# Patient Record
(demographics unavailable — no encounter records)

---

## 2025-04-16 NOTE — HISTORY OF PRESENT ILLNESS
[FreeTextEntry1] : 56 year old male with PMH of HTN, HLD and DM presented to [facility] with chest pain. Pt had cardiac CT 2 weeks ago showing LAD 90%, was planned for LHC on Thursday at [facility] but developed chest pain last night prompting him to present to ED at [facility]. He described his chest pain as tingling and stabbing on left lateral wall of his chest, non radiating. In [facility] ED, EKG with no NICOLE but had positive troponin. s/p LHC showing LM 90%. LAD 50-60%, LCx 40%, RCA mild collateral to OM, RPL 70%, ramux 70%, IABP was placed. He was transferred to [facility] for CABG evaluation  4/10 s/p C4B left radial harvest site; Post op hypotension related to hypovolemia, Post op bradycardia requiring Atrial pacing 4/11 VSS; A- paced 90s on Lop 25 BID, Amio ERP. on 4 L NC --> wean to Room Air spo2 goal of 92% and above. Weight 86 kg from 76 preop --> received lasix 20 x 1 this AM, lasix 40 bid and aldactone 25 qd to start tomorrow. increase ambulation. increase use of incentive spirometer. 4/12 Right chest tube mediastinals and PW removed. Trinh removed 4/13 Right chest tube removed, voiding, +BM. Plan for discharge in am. Trend creat 4/14 Creat 1.6. Baseline 1.5. Decrease diuretics. VSS Discharge home Disposition: Home no PT needs. 4/16 Initial visit completed at home  CC " I feel well"  Detail Level: Detailed

## 2025-04-16 NOTE — ASSESSMENT
[FreeTextEntry1] : Pt recovering well at home s/p OHS. Reviewed all medications and dosages with pt understanding. Pt has all medications in home and is taking as prescribed. Pt. potassium level on day of discharge was 3.7 and was sent home on KCL 20MEQ QD, Spironolactone 25mg QD and lasix 40mg QD. CTS ACP Gigi Patel made aware and pt. to have repeat CMP during POA on 4/22. Pain controlled with current medication regimen. No new symptoms, issues or concerns to report at this time. pt. encouraged to keep good glycemic control and follow consistent carbohydrate diet.

## 2025-04-16 NOTE — PLAN
[TextEntry] : Post Operative Care:  Pt advised of importance of daily weights. Pt instructed on how to use incentive spirometer every hour, demonstrated proper use. Pt encouraged to ambulate as much as tolerated, avoiding extreme temperatures outdoors. Also advised to cleanse incisions daily with mild soap and water and to avoid lotions, powders, ointments or creams near or on the incision. Low salt, low fat diet encouraged and discussed. Pt advised to avoid heavy lifting or straining.     [facility] team will continue to follow up with pt status.  NP/CCC roles explained with pt understanding, contact information provided. Pt agrees to call with any questions, issues or concerns.  Worsening symptoms reviewed with patient understanding.      FOLLOW UP APPOINTMENTS:  CTS:  4/22  CARDIOLOGIST:  pt. will call to schedule appointment to be seen within 2 weeks   PCP:4/23

## 2025-04-16 NOTE — ASSESSMENT
[FreeTextEntry1] : 56 year old male with PMH of HTN, HLD and DM presented to [facility] with chest pain. Pt had cardiac CT 2 weeks ago showing LAD 90%, was planned for LHC on Thursday at [facility] but developed chest pain last night prompting him to present to ED at [facility]. At [facility] ED, EKG with no NICOLE but had positive troponin.  s/p LHC  showing LM 90%. LAD 50-60%, LCx 40%, RCA mild collateral to OM, RPL 70%, ramux 70%, IABP was placed.  He was transferred to [facility] for CABG evaluation  4/10 s/p C4B left radial harvest site  Post op hypotension related to hypovolemia, Post op bradycardia requiring Atrial pacing, A- paced 90s on Lop 25 BID, Amio ERP. on 4 L NC --> wean to Room Air, Weight up to 86 kg from 76 preop --> received lasix 20 x 1 this AM, lasix 40 bid and aldactone 25 qd to start tomorrow.  Increase ambulation. Trinh removed Trend Cr, Baseline 1.5. Decrease diuretics. VSS Discharge home  *** Needs CMP drawn today given Lasix, aldactone and 20meq of K  aspirin 81 mg oral delayed release tablet: 1 tab(s) orally once a day atorvastatin 80 mg oral tablet: 1 tab(s) orally once a day (at bedtime) cholecalciferol 1250 mcg (50,000 intl units) oral capsule: 1 cap(s) orally once a week furosemide 40 mg oral tablet: 1 tab(s) orally once a day glimepiride 1 mg oral tablet: 1 tab(s) orally once a day HYDROmorphone 2 mg oral tablet: 1 tab(s) orally every 6 hours as needed for Moderate Pain (4 - 6) MDD: metoprolol succinate 50 mg oral capsule, extended release: 1 cap(s) orally once a day pantoprazole 40 mg oral delayed release tablet: 1 tab(s) orally once a day potassium chloride 20 mEq oral tablet, extended release: 1 tab(s) orally once senna leaf extract oral tablet: 2 tab(s) orally once a day (at bedtime) sitagliptin (as base)-metformin 50 mg-1000 mg oral tablet, extended release: 1 tab(s) orally 2 times a day spironolactone 25 mg oral tablet: 1 tab(s) orally once a day tamsulosin 0.4 mg oral capsule: 1 cap(s) orally once a day (at bedtime)

## 2025-04-16 NOTE — PHYSICAL EXAM
[] : no respiratory distress [Exaggerated Use Of Accessory Muscles For Inspiration] : no accessory muscle use [Auscultation Breath Sounds / Voice Sounds] : lungs were clear to auscultation bilaterally [Heart Sounds] : normal S1 and S2 [Chest Visual Inspection Thoracic Asymmetry] : no chest asymmetry [Bowel Sounds] : normal bowel sounds [Oriented To Time, Place, And Person] : oriented to person, place, and time [FreeTextEntry2] : no LE edema noted [FreeTextEntry1] : Left radial and LLE SVG site kevin and well approximated. No erythema or drainage  noted

## 2025-04-22 NOTE — REASON FOR VISIT
[Family Member] : family member [de-identified] : C4B left radial harvest site [de-identified] : 4/10/25

## 2025-04-22 NOTE — REASON FOR VISIT
[Family Member] : family member [de-identified] : C4B left radial harvest site [de-identified] : 4/10/25

## 2025-04-22 NOTE — ASSESSMENT
[FreeTextEntry1] : 56 year old male with PMH of HTN, HLD and DM presented to [facility] with chest pain. Pt had cardiac CT 2 weeks ago showing LAD 90%, was planned for LHC on Thursday at [facility] but developed chest pain last night prompting him to present to ED at [facility]. At [facility] ED, EKG with no NICOLE but had positive troponin.  s/p LHC  showing LM 90%. LAD 50-60%, LCx 40%, RCA mild collateral to OM, RPL 70%, ramux 70%, IABP was placed.  He was transferred to [facility] for CABG evaluation  4/10 s/p C4B left radial harvest site  Post op hypotension related to hypovolemia, Post op bradycardia requiring Atrial pacing, A- paced 90s on Lop 25 BID, Amio ERP. on 4 L NC --> wean to Room Air, Weight up to 86 kg from 76 preop --> received lasix 20 x 1 this AM, lasix 40 bid and aldactone 25 qd to start tomorrow.  Increase ambulation. Trinh removed Trend Cr, Baseline 1.5. Decrease diuretics. VSS Discharge home  Discharge meds   aspirin 81 mg oral delayed release tablet: 1 tab(s) orally once a day atorvastatin 80 mg oral tablet: 1 tab(s) orally once a day (at bedtime) cholecalciferol 1250 mcg (50,000 intl units) oral capsule: 1 cap(s) orally once a week furosemide 40 mg oral tablet: 1 tab(s) orally once a day glimepiride 1 mg oral tablet: 1 tab(s) orally once a day HYDROmorphone 2 mg oral tablet: 1 tab(s) orally every 6 hours as needed for Moderate Pain (4 - 6) MDD: metoprolol succinate 50 mg oral capsule, extended release: 1 cap(s) orally once a day pantoprazole 40 mg oral delayed release tablet: 1 tab(s) orally once a day potassium chloride 20 mEq oral tablet, extended release: 1 tab(s) orally once senna leaf extract oral tablet: 2 tab(s) orally once a day (at bedtime) sitagliptin (as base)-metformin 50 mg-1000 mg oral tablet, extended release: 1 tab(s) orally 2 times a day spironolactone 25 mg oral tablet: 1 tab(s) orally once a day tamsulosin 0.4 mg oral capsule: 1 cap(s) orally once a day (at bedtime)  Today he presents and reports that he has shortness of breath which is getting better, tired and occasional palpitations.    Today on exam bilateral lung fields are clear, no wheezing or rales, no use of accessory muscles noted or respiratory distress, normal sinus rhythm, sternum stable, incision clean, dry and intact. LT radial and LT  SVG site is clean, dry and intact.  No peripheral edema noted. Sutures  removed today.    Instructed patient on importance of optimal glycemic control, daily showering, daily weights, any signs of fever (temperature greater than 101F, chills,  incentive spirometer use, and increase ambulation as tolerated. Instructed to call office with any signs or symptoms of infection or weight gain of 2 or more pounds in 1 day or 3 or more pounds in 1 week.    Discussed intake of plant based foods, including vegetables, fruits, and whole grain foods: legumes, nuts and seeds, fish or seafood, lean meats, and non-fat or low-fat diary foods. Plant based oils (non-tropical) in place of solid fats. Instructed patient to limit intake of high fat meats and processed meats, high-fat diary foods, dietary cholesterol and sodium, foods and beverages with added sugars.   Plan: 1) Continue current medication regimen 2) Follow up with cardiologist ( Dr. Elliot Valiente) and PCP 3) May return on as needed basis 4) Discontinue diuretics  5) SBE antibiotic prophylaxis discussed at length 6) Continue to increase activity and walk daily as tolerated. Continue to use incentive spirometer. 7) Keep legs elevated above heart when resting/sitting/sleeping. 8) Call MD if you experience fever, fatigue, dizziness, confusion, syncope, shortness of breath, chest pain not relieved with analgesics, increased redness/drainage from the surgical  incision site

## 2025-04-22 NOTE — REASON FOR VISIT
[Family Member] : family member [de-identified] : C4B left radial harvest site [de-identified] : 4/10/25

## 2025-04-22 NOTE — ASSESSMENT
[FreeTextEntry1] : 56 year old male with PMH of HTN, HLD and DM presented to [facility] with chest pain. Pt had cardiac CT 2 weeks ago showing LAD 90%, was planned for LHC on Thursday at [facility] but developed chest pain last night prompting him to present to ED at [facility]. At [facility] ED, EKG with no NICOLE but had positive troponin.  s/p LHC  showing LM 90%. LAD 50-60%, LCx 40%, RCA mild collateral to OM, RPL 70%, ramux 70%, IABP was placed.  He was transferred to [facility] for CABG evaluation  4/10 s/p C4B left radial harvest site  Post op hypotension related to hypovolemia, Post op bradycardia requiring Atrial pacing, A- paced 90s on Lop 25 BID, Amio ERP. on 4 L NC --> wean to Room Air, Weight up to 86 kg from 76 preop --> received lasix 20 x 1 this AM, lasix 40 bid and aldactone 25 qd to start tomorrow.  Increase ambulation. Trinh removed Trend Cr, Baseline 1.5. Decrease diuretics. VSS Discharge home  Discharge meds   aspirin 81 mg oral delayed release tablet: 1 tab(s) orally once a day atorvastatin 80 mg oral tablet: 1 tab(s) orally once a day (at bedtime) cholecalciferol 1250 mcg (50,000 intl units) oral capsule: 1 cap(s) orally once a week furosemide 40 mg oral tablet: 1 tab(s) orally once a day glimepiride 1 mg oral tablet: 1 tab(s) orally once a day HYDROmorphone 2 mg oral tablet: 1 tab(s) orally every 6 hours as needed for Moderate Pain (4 - 6) MDD: metoprolol succinate 50 mg oral capsule, extended release: 1 cap(s) orally once a day pantoprazole 40 mg oral delayed release tablet: 1 tab(s) orally once a day potassium chloride 20 mEq oral tablet, extended release: 1 tab(s) orally once senna leaf extract oral tablet: 2 tab(s) orally once a day (at bedtime) sitagliptin (as base)-metformin 50 mg-1000 mg oral tablet, extended release: 1 tab(s) orally 2 times a day spironolactone 25 mg oral tablet: 1 tab(s) orally once a day tamsulosin 0.4 mg oral capsule: 1 cap(s) orally once a day (at bedtime)  Today he presents and reports that he has shortness of breath which is getting better, tired and occasional palpitations.    Today on exam bilateral lung fields are clear, no wheezing or rales, no use of accessory muscles noted or respiratory distress, normal sinus rhythm, sternum stable, incision clean, dry and intact. LT radial and LT  SVG site is clean, dry and intact.  No peripheral edema noted. Sutures  removed today.    Instructed patient on importance of optimal glycemic control, daily showering, daily weights, any signs of fever (temperature greater than 101F, chills,  incentive spirometer use, and increase ambulation as tolerated. Instructed to call office with any signs or symptoms of infection or weight gain of 2 or more pounds in 1 day or 3 or more pounds in 1 week.    Discussed intake of plant based foods, including vegetables, fruits, and whole grain foods: legumes, nuts and seeds, fish or seafood, lean meats, and non-fat or low-fat diary foods. Plant based oils (non-tropical) in place of solid fats. Instructed patient to limit intake of high fat meats and processed meats, high-fat diary foods, dietary cholesterol and sodium, foods and beverages with added sugars.   Plan: 1) Continue current medication regimen 2) Follow up with cardiologist ( Dr. Elliot Valiente) and PCP 3) May return on as needed basis 4) Discontinue diuretics  5) SBE antibiotic prophylaxis discussed at length 6) Continue to increase activity and walk daily as tolerated. Continue to use incentive spirometer. 7) Keep legs elevated above heart when resting/sitting/sleeping. 8) Call MD if you experience fever, fatigue, dizziness, confusion, syncope, shortness of breath, chest pain not relieved with analgesics, increased redness/drainage from the surgical  incision site

## 2025-04-22 NOTE — PHYSICAL EXAM
[] : no respiratory distress [Respiration, Rhythm And Depth] : normal respiratory rhythm and effort [Auscultation Breath Sounds / Voice Sounds] : lungs were clear to auscultation bilaterally [Apical Impulse] : the apical impulse was normal [Heart Rate And Rhythm] : heart rate was normal and rhythm regular [Heart Sounds] : normal S1 and S2 [Murmurs] : no murmurs [Clean] : clean [Dry] : dry [Healing Well] : healing well [No Edema] : no edema [FreeTextEntry5] : LT G site  [FreeTextEntry1] : LT radial

## 2025-04-22 NOTE — ASSESSMENT
[FreeTextEntry1] : 56 year old male with PMH of HTN, HLD and DM presented to [facility] with chest pain. Pt had cardiac CT 2 weeks ago showing LAD 90%, was planned for LHC on Thursday at [facility] but developed chest pain last night prompting him to present to ED at [facility]. At [facility] ED, EKG with no NICOLE but had positive troponin.  s/p LHC  showing LM 90%. LAD 50-60%, LCx 40%, RCA mild collateral to OM, RPL 70%, ramux 70%, IABP was placed.  He was transferred to [facility] for CABG evaluation  4/10 s/p C4B left radial harvest site  Post op hypotension related to hypovolemia, Post op bradycardia requiring Atrial pacing, A- paced 90s on Lop 25 BID, Amio ERP. on 4 L NC --> wean to Room Air, Weight up to 86 kg from 76 preop --> received lasix 20 x 1 this AM, lasix 40 bid and aldactone 25 qd to start tomorrow.  Increase ambulation. Trinh removed Trend Cr, Baseline 1.5. Decrease diuretics. VSS Discharge home  Discharge meds   aspirin 81 mg oral delayed release tablet: 1 tab(s) orally once a day atorvastatin 80 mg oral tablet: 1 tab(s) orally once a day (at bedtime) cholecalciferol 1250 mcg (50,000 intl units) oral capsule: 1 cap(s) orally once a week furosemide 40 mg oral tablet: 1 tab(s) orally once a day glimepiride 1 mg oral tablet: 1 tab(s) orally once a day HYDROmorphone 2 mg oral tablet: 1 tab(s) orally every 6 hours as needed for Moderate Pain (4 - 6) MDD: metoprolol succinate 50 mg oral capsule, extended release: 1 cap(s) orally once a day pantoprazole 40 mg oral delayed release tablet: 1 tab(s) orally once a day potassium chloride 20 mEq oral tablet, extended release: 1 tab(s) orally once senna leaf extract oral tablet: 2 tab(s) orally once a day (at bedtime) sitagliptin (as base)-metformin 50 mg-1000 mg oral tablet, extended release: 1 tab(s) orally 2 times a day spironolactone 25 mg oral tablet: 1 tab(s) orally once a day tamsulosin 0.4 mg oral capsule: 1 cap(s) orally once a day (at bedtime)  Today he presents and reports that he has shortness of breath which is getting better, tired and occasional palpitations.    Today on exam bilateral lung fields are clear, no wheezing or rales, no use of accessory muscles noted or respiratory distress, normal sinus rhythm, sternum stable, incision clean, dry and intact. LT radial and LT  SVG site is clean, dry and intact.  No peripheral edema noted. Sutures  removed today.    Instructed patient on importance of optimal glycemic control, daily showering, daily weights, any signs of fever (temperature greater than 101F, chills,  incentive spirometer use, and increase ambulation as tolerated. Instructed to call office with any signs or symptoms of infection or weight gain of 2 or more pounds in 1 day or 3 or more pounds in 1 week.    Discussed intake of plant based foods, including vegetables, fruits, and whole grain foods: legumes, nuts and seeds, fish or seafood, lean meats, and non-fat or low-fat diary foods. Plant based oils (non-tropical) in place of solid fats. Instructed patient to limit intake of high fat meats and processed meats, high-fat diary foods, dietary cholesterol and sodium, foods and beverages with added sugars.   Plan: 1) Continue current medication regimen 2) Follow up with cardiologist ( Dr. Elliot Valiente) and PCP 3) May return on as needed basis 4) Discontinue diuretics  5) SBE antibiotic prophylaxis discussed at length 6) Continue to increase activity and walk daily as tolerated. Continue to use incentive spirometer. 7) Keep legs elevated above heart when resting/sitting/sleeping. 8) Call MD if you experience fever, fatigue, dizziness, confusion, syncope, shortness of breath, chest pain not relieved with analgesics, increased redness/drainage from the surgical  incision site

## 2025-04-22 NOTE — REASON FOR VISIT
[Family Member] : family member [de-identified] : C4B left radial harvest site [de-identified] : 4/10/25

## 2025-04-22 NOTE — ASSESSMENT
[FreeTextEntry1] : 56 year old male with PMH of HTN, HLD and DM presented to [facility] with chest pain. Pt had cardiac CT 2 weeks ago showing LAD 90%, was planned for LHC on Thursday at [facility] but developed chest pain last night prompting him to present to ED at [facility]. At [facility] ED, EKG with no NICOLE but had positive troponin.  s/p LHC  showing LM 90%. LAD 50-60%, LCx 40%, RCA mild collateral to OM, RPL 70%, ramux 70%, IABP was placed.  He was transferred to [facility] for CABG evaluation  4/10 s/p C4B left radial harvest site  Post op hypotension related to hypovolemia, Post op bradycardia requiring Atrial pacing, A- paced 90s on Lop 25 BID, Amio ERP. on 4 L NC --> wean to Room Air, Weight up to 86 kg from 76 preop --> received lasix 20 x 1 this AM, lasix 40 bid and aldactone 25 qd to start tomorrow.  Increase ambulation. Trinh removed Trend Cr, Baseline 1.5. Decrease diuretics. VSS Discharge home  Discharge meds   aspirin 81 mg oral delayed release tablet: 1 tab(s) orally once a day atorvastatin 80 mg oral tablet: 1 tab(s) orally once a day (at bedtime) cholecalciferol 1250 mcg (50,000 intl units) oral capsule: 1 cap(s) orally once a week furosemide 40 mg oral tablet: 1 tab(s) orally once a day glimepiride 1 mg oral tablet: 1 tab(s) orally once a day HYDROmorphone 2 mg oral tablet: 1 tab(s) orally every 6 hours as needed for Moderate Pain (4 - 6) MDD: metoprolol succinate 50 mg oral capsule, extended release: 1 cap(s) orally once a day pantoprazole 40 mg oral delayed release tablet: 1 tab(s) orally once a day potassium chloride 20 mEq oral tablet, extended release: 1 tab(s) orally once senna leaf extract oral tablet: 2 tab(s) orally once a day (at bedtime) sitagliptin (as base)-metformin 50 mg-1000 mg oral tablet, extended release: 1 tab(s) orally 2 times a day spironolactone 25 mg oral tablet: 1 tab(s) orally once a day tamsulosin 0.4 mg oral capsule: 1 cap(s) orally once a day (at bedtime)  Today he presents and reports that he has shortness of breath which is getting better, tired and occasional palpitations.    Today on exam bilateral lung fields are clear, no wheezing or rales, no use of accessory muscles noted or respiratory distress, normal sinus rhythm, sternum stable, incision clean, dry and intact. LT radial and LT  SVG site is clean, dry and intact.  No peripheral edema noted. Sutures  removed today.    Instructed patient on importance of optimal glycemic control, daily showering, daily weights, any signs of fever (temperature greater than 101F, chills,  incentive spirometer use, and increase ambulation as tolerated. Instructed to call office with any signs or symptoms of infection or weight gain of 2 or more pounds in 1 day or 3 or more pounds in 1 week.    Discussed intake of plant based foods, including vegetables, fruits, and whole grain foods: legumes, nuts and seeds, fish or seafood, lean meats, and non-fat or low-fat diary foods. Plant based oils (non-tropical) in place of solid fats. Instructed patient to limit intake of high fat meats and processed meats, high-fat diary foods, dietary cholesterol and sodium, foods and beverages with added sugars.   Plan: 1) Continue current medication regimen 2) Follow up with cardiologist ( Dr. Elliot Valiente) and PCP 3) May return on as needed basis 4) Discontinue diuretics  5) SBE antibiotic prophylaxis discussed at length 6) Continue to increase activity and walk daily as tolerated. Continue to use incentive spirometer. 7) Keep legs elevated above heart when resting/sitting/sleeping. 8) Call MD if you experience fever, fatigue, dizziness, confusion, syncope, shortness of breath, chest pain not relieved with analgesics, increased redness/drainage from the surgical  incision site